# Patient Record
Sex: FEMALE | Race: WHITE | NOT HISPANIC OR LATINO
[De-identification: names, ages, dates, MRNs, and addresses within clinical notes are randomized per-mention and may not be internally consistent; named-entity substitution may affect disease eponyms.]

---

## 2019-05-13 PROBLEM — Z00.00 ENCOUNTER FOR PREVENTIVE HEALTH EXAMINATION: Status: ACTIVE | Noted: 2019-05-13

## 2019-05-18 ENCOUNTER — APPOINTMENT (OUTPATIENT)
Dept: CT IMAGING | Facility: HOSPITAL | Age: 49
End: 2019-05-18
Payer: MEDICAID

## 2019-05-18 ENCOUNTER — OUTPATIENT (OUTPATIENT)
Dept: OUTPATIENT SERVICES | Facility: HOSPITAL | Age: 49
LOS: 1 days | End: 2019-05-18
Payer: MEDICAID

## 2019-05-18 PROCEDURE — 70486 CT MAXILLOFACIAL W/O DYE: CPT

## 2019-05-18 PROCEDURE — 70486 CT MAXILLOFACIAL W/O DYE: CPT | Mod: 26

## 2019-07-09 ENCOUNTER — APPOINTMENT (OUTPATIENT)
Dept: OTOLARYNGOLOGY | Facility: CLINIC | Age: 49
End: 2019-07-09
Payer: MEDICAID

## 2019-07-09 VITALS
BODY MASS INDEX: 21.97 KG/M2 | SYSTOLIC BLOOD PRESSURE: 119 MMHG | DIASTOLIC BLOOD PRESSURE: 72 MMHG | WEIGHT: 140 LBS | HEIGHT: 67 IN | HEART RATE: 73 BPM

## 2019-07-09 DIAGNOSIS — Z80.9 FAMILY HISTORY OF MALIGNANT NEOPLASM, UNSPECIFIED: ICD-10-CM

## 2019-07-09 DIAGNOSIS — Z78.9 OTHER SPECIFIED HEALTH STATUS: ICD-10-CM

## 2019-07-09 DIAGNOSIS — Z83.3 FAMILY HISTORY OF DIABETES MELLITUS: ICD-10-CM

## 2019-07-09 DIAGNOSIS — Z82.49 FAMILY HISTORY OF ISCHEMIC HEART DISEASE AND OTHER DISEASES OF THE CIRCULATORY SYSTEM: ICD-10-CM

## 2019-07-09 PROCEDURE — 31231 NASAL ENDOSCOPY DX: CPT

## 2019-07-09 PROCEDURE — 99204 OFFICE O/P NEW MOD 45 MIN: CPT | Mod: 25

## 2019-07-09 RX ORDER — ACETAMINOPHEN/DIPHENHYDRAMINE 500MG-25MG
1000 TABLET ORAL
Refills: 0 | Status: ACTIVE | COMMUNITY

## 2019-07-09 RX ORDER — ASPIRIN 81 MG
81 TABLET, DELAYED RELEASE (ENTERIC COATED) ORAL
Refills: 0 | Status: ACTIVE | COMMUNITY

## 2019-07-09 RX ORDER — FLUTICASONE PROPIONATE 50 UG/1
50 SPRAY, METERED NASAL
Refills: 0 | Status: ACTIVE | COMMUNITY

## 2019-07-09 RX ORDER — UBIDECARENONE/VIT E ACET 100MG-5
CAPSULE ORAL
Refills: 0 | Status: ACTIVE | COMMUNITY

## 2019-07-09 NOTE — PROCEDURE
[FreeTextEntry3] : Nasal Endoscopy:\par nasal airways patent\par middle meati grossly patent\par no mucopus or polyps

## 2019-07-09 NOTE — PHYSICAL EXAM
[Nasal Endoscopy Performed] : nasal endoscopy was performed, see procedure section for findings [Midline] : trachea located in midline position [Normal] : no rashes [FreeTextEntry2] : face symmetric contour [de-identified] : EOMI [de-identified] : CN 2-12 grossly symmetric and intact [de-identified] : V2 intact

## 2019-07-09 NOTE — DATA REVIEWED
[de-identified] : CT Sinus 5/18/19:\par Marked expansion with a predominantly groundglass pattern of mineralization \par is noted at the central skull base diffusely with anterior extension into \par the posterior sinonasal cavity. 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 There is diffuse bilaterally \par symmetric involvement of the body and lesser wings of the sphenoid bone with \par complete encasement of the optic canals which appear mildly and \par symmetrically narrowed. The appearance of the sphenoid greater wings and \par pterygoid processes is that of minimal, if any, expansion and a more \par heterogeneously sclerotic appearance to the bone. There is involvement of \par the posterior aspect of each orbital roof, without significant encroachment \par on the orbital apex. There is involvement of the perpendicular plate of the \par ethmoid, with thickening of the posterior aspect of the nasal septum. There \par is involvement of bilateral posterior ethmoid complex with anterior \par extension on the right side to abut the posterior margin of the patent \par frontal sinus outflow tract. There is masslike expansion of the planum \par sphenoidale with posterior extension to involve the sella floor. There is \par minimal involvement of the dorsal or inferior clivus. At the central skull \par base the bilateral foramen rotundum and vidian canals appear narrow but \par patent. \par \par There is a large polyp or retention cysts in the right maxillary antrum with \par the remaining paranasal sinuses free of abnormal soft tissue. There has been \par previous left mastoidectomy, with the mastoidectomy bowl and remaining \par bilateral mastoid air cells predominantly ventilated. The orbital contents \par are grossly normal. There is no hydrocephalus or large intracranial mass \par lesion. 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

## 2019-07-09 NOTE — HISTORY OF PRESENT ILLNESS
[de-identified] : 48F here for initial evaluation.\par \par She was referred for evaluation after CT sinus showed incidental fibrous dysplasia.\par She has no acute complaints - there is no facial pressure, no nasal congestion/obstruction, no nasal drainage or postnasal drip. Olfaction is intact. She has no numbness or altered sensation in her face. Vision is intact.\par She does c/o intermittent sharp left facial pain.\par \par CT Sinus 5/18/19 (I reviewed images):\par -Marked expansion with a predominantly groundglass pattern of mineralization at the central skull base diffusely with anterior extension into the posterior sinonasal cavity\par -There is diffuse bilateral symmetric involvement of the body and lesser wings of the sphenoid bone with complete encasement of the optic canals which appear mildly and symmetrically narrowed.\par -There is involvement of the posterior aspect of each orbital roof, without significant encroachment on the orbital apex. -At the central skull  base the bilateral foramen rotundum and vidian canals appear narrow but patent.\par \par ROS otherwise unremarkable.

## 2019-07-09 NOTE — ASSESSMENT
[FreeTextEntry1] : 48F here for initial evaluation, referred for evaluation after CT sinus showed fibrous dysplasia. She has no acute complaints - there is no facial pressure, no nasal congestion/obstruction, no nasal drainage or postnasal drip. Olfaction is intact. She has no numbness or altered sensation in her face. Vision is intact. She does c/o intermittent sharp left facial pain which is occasional and random, without triggers. 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 On exam, extraocular muscles are intact with full movement and facial sensation is intact. Nasal endoscopy shows a patent nasal airway. The rest of the head and neck exam is unremarkable.\par Despite her CT showing marked bony expansion of the central skull base diffusely with anterior extension into \par the posterior sinonasal cavity, she is completely asymptomatic, with seemingly intact optic nerves and trigeminal systems. At this point, I will send for formal neuroophthalmologic evaluation, given the complete encasement of the optic canals by the dysplastic bone, which are symmetrically narrowed. RTO after consultation.

## 2019-07-09 NOTE — CONSULT LETTER
[Dear  ___] : Dear ~SHAHRAM, [Courtesy Letter:] : I had the pleasure of seeing your patient, [unfilled], in my office today. [Consult Closing:] : Thank you very much for allowing me to participate in the care of this patient.  If you have any questions, please do not hesitate to contact me. [Sincerely,] : Sincerely, [FreeTextEntry3] : Srinivas Hairston MD\par Department of Otolaryngology - Head and Neck Surgery\par [facility] [DrBakari  ___] : Dr. LAGUNAS [DrBakari ___] : Dr. LAGUNAS

## 2019-09-04 ENCOUNTER — APPOINTMENT (OUTPATIENT)
Dept: OPHTHALMOLOGY | Facility: CLINIC | Age: 49
End: 2019-09-04

## 2019-09-09 ENCOUNTER — EMERGENCY (EMERGENCY)
Facility: HOSPITAL | Age: 49
LOS: 1 days | Discharge: HOME | End: 2019-09-09
Attending: EMERGENCY MEDICINE
Payer: MEDICAID

## 2019-09-09 VITALS
OXYGEN SATURATION: 99 % | RESPIRATION RATE: 18 BRPM | HEIGHT: 64 IN | WEIGHT: 139.99 LBS | SYSTOLIC BLOOD PRESSURE: 121 MMHG | TEMPERATURE: 99 F | DIASTOLIC BLOOD PRESSURE: 77 MMHG | HEART RATE: 75 BPM

## 2019-09-09 LAB
ALBUMIN SERPL ELPH-MCNC: 3.7 G/DL — SIGNIFICANT CHANGE UP (ref 3.5–5)
ALP SERPL-CCNC: 46 U/L — SIGNIFICANT CHANGE UP (ref 40–120)
ALT FLD-CCNC: 19 U/L DA — SIGNIFICANT CHANGE UP (ref 10–60)
ANION GAP SERPL CALC-SCNC: 5 MMOL/L — SIGNIFICANT CHANGE UP (ref 5–17)
APPEARANCE UR: CLEAR — SIGNIFICANT CHANGE UP
AST SERPL-CCNC: 13 U/L — SIGNIFICANT CHANGE UP (ref 10–40)
BILIRUB SERPL-MCNC: 0.2 MG/DL — SIGNIFICANT CHANGE UP (ref 0.2–1.2)
BILIRUB UR-MCNC: NEGATIVE — SIGNIFICANT CHANGE UP
BUN SERPL-MCNC: 20 MG/DL — HIGH (ref 7–18)
CALCIUM SERPL-MCNC: 8.8 MG/DL — SIGNIFICANT CHANGE UP (ref 8.4–10.5)
CHLORIDE SERPL-SCNC: 103 MMOL/L — SIGNIFICANT CHANGE UP (ref 96–108)
CO2 SERPL-SCNC: 29 MMOL/L — SIGNIFICANT CHANGE UP (ref 22–31)
COLOR SPEC: YELLOW — SIGNIFICANT CHANGE UP
CREAT SERPL-MCNC: 1.15 MG/DL — SIGNIFICANT CHANGE UP (ref 0.5–1.3)
DIFF PNL FLD: NEGATIVE — SIGNIFICANT CHANGE UP
GLUCOSE SERPL-MCNC: 94 MG/DL — SIGNIFICANT CHANGE UP (ref 70–99)
GLUCOSE UR QL: NEGATIVE — SIGNIFICANT CHANGE UP
HCG SERPL-ACNC: <1 MIU/ML — SIGNIFICANT CHANGE UP
HCT VFR BLD CALC: 38.3 % — SIGNIFICANT CHANGE UP (ref 34.5–45)
HGB BLD-MCNC: 12 G/DL — SIGNIFICANT CHANGE UP (ref 11.5–15.5)
KETONES UR-MCNC: NEGATIVE — SIGNIFICANT CHANGE UP
LEUKOCYTE ESTERASE UR-ACNC: NEGATIVE — SIGNIFICANT CHANGE UP
LIDOCAIN IGE QN: 185 U/L — SIGNIFICANT CHANGE UP (ref 73–393)
MCHC RBC-ENTMCNC: 27.6 PG — SIGNIFICANT CHANGE UP (ref 27–34)
MCHC RBC-ENTMCNC: 31.3 GM/DL — LOW (ref 32–36)
MCV RBC AUTO: 88.2 FL — SIGNIFICANT CHANGE UP (ref 80–100)
NITRITE UR-MCNC: NEGATIVE — SIGNIFICANT CHANGE UP
NRBC # BLD: 0 /100 WBCS — SIGNIFICANT CHANGE UP (ref 0–0)
PH UR: 6.5 — SIGNIFICANT CHANGE UP (ref 5–8)
PLATELET # BLD AUTO: 215 K/UL — SIGNIFICANT CHANGE UP (ref 150–400)
POTASSIUM SERPL-MCNC: 3.8 MMOL/L — SIGNIFICANT CHANGE UP (ref 3.5–5.3)
POTASSIUM SERPL-SCNC: 3.8 MMOL/L — SIGNIFICANT CHANGE UP (ref 3.5–5.3)
PROT SERPL-MCNC: 7.2 G/DL — SIGNIFICANT CHANGE UP (ref 6–8.3)
PROT UR-MCNC: NEGATIVE — SIGNIFICANT CHANGE UP
RBC # BLD: 4.34 M/UL — SIGNIFICANT CHANGE UP (ref 3.8–5.2)
RBC # FLD: 14.4 % — SIGNIFICANT CHANGE UP (ref 10.3–14.5)
SODIUM SERPL-SCNC: 137 MMOL/L — SIGNIFICANT CHANGE UP (ref 135–145)
SP GR SPEC: 1.01 — SIGNIFICANT CHANGE UP (ref 1.01–1.02)
UROBILINOGEN FLD QL: 1
WBC # BLD: 4.92 K/UL — SIGNIFICANT CHANGE UP (ref 3.8–10.5)
WBC # FLD AUTO: 4.92 K/UL — SIGNIFICANT CHANGE UP (ref 3.8–10.5)

## 2019-09-09 PROCEDURE — 99285 EMERGENCY DEPT VISIT HI MDM: CPT

## 2019-09-09 PROCEDURE — 74177 CT ABD & PELVIS W/CONTRAST: CPT | Mod: 26

## 2019-09-09 RX ORDER — KETOROLAC TROMETHAMINE 30 MG/ML
15 SYRINGE (ML) INJECTION ONCE
Refills: 0 | Status: DISCONTINUED | OUTPATIENT
Start: 2019-09-09 | End: 2019-09-09

## 2019-09-09 RX ADMIN — Medication 15 MILLIGRAM(S): at 22:21

## 2019-09-09 NOTE — ED PROVIDER NOTE - NSFOLLOWUPINSTRUCTIONS_ED_ALL_ED_FT
You have an abnormal finding on your CT scan which requires prompt follow up within 1-2 wks for breast imaging and colonoscopy.   Take augmentin as prescribed.  Please follow up with your personal medical doctor in 24-48 hours.   Bring results from today and these instructions to your visit.  If your symptoms change, get worse or if you have any new symptoms, come to the ER right away.  If you have any questions, call the ER at the phone number on this page.

## 2019-09-09 NOTE — ED ADULT NURSE NOTE - OBJECTIVE STATEMENT
pt c/o of 5/10 abdominal pain started yesterday morning. c/o of nausea but denied vomiting and diarrhea. denies fever

## 2019-09-09 NOTE — ED PROVIDER NOTE - PATIENT PORTAL LINK FT
You can access the FollowMyHealth Patient Portal offered by [facility] by registering at the following website: [email or web address]. By joining [facility]’s FollowMyHealth portal, you will also be able to view your health information using other applications (apps) compatible with our system.

## 2019-09-09 NOTE — ED ADULT NURSE NOTE - NSIMPLEMENTINTERV_GEN_ALL_ED
Implemented All Universal Safety Interventions:  [place] to call system. Call bell, personal items and telephone within reach. Instruct patient to call for assistance. Room bathroom lighting operational. Non-slip footwear when patient is off stretcher. Physically safe environment: no spills, clutter or unnecessary equipment. Stretcher in lowest position, wheels locked, appropriate side rails in place.

## 2019-09-09 NOTE — ED PROVIDER NOTE - PROGRESS NOTE DETAILS
pt endorsed to me. has breast finding which needs f/u pt had recent mammogram = advised may need additional imaging - bring to pcp, rectal mass needs prompt biopsy, rx augmentin for mild divertic -Baldo Stafford MD- The abnomal lab/radiology findings were expressed to the patient. A copy of all the results from today's visit was provided to the patient to assist in the continued workup by the patient's outpatient provider. The patient is advised to follow up with the outpatient provider with these results in hand at the time advised on the discharge document.

## 2019-09-09 NOTE — ED PROVIDER NOTE - NSFOLLOWUPCLINICS_GEN_ALL_ED_FT
[facility]  Gastroenterology  [address] [place] [id number]  Phone: [phone]  Fax:   Follow Up Time:

## 2019-09-09 NOTE — ED PROVIDER NOTE - CARE PROVIDER_API CALL
Fermin Palomares)  Gastroenterology; Internal Medicine  [address] [place] [id number]  Phone: [phone]  Fax: [phone]  Follow Up Time: 7-10 Days

## 2019-09-09 NOTE — ED PROVIDER NOTE - OBJECTIVE STATEMENT
50 yo female with no pmh presents to the ED for LLQ abd pain since yesterday. Pt states pain started as a generalized abd pain and now is localized to the LLQ. She denies N/V/D, fevers, chest pain, sob, cough, headache, dizziness. Pt states that she has occasional constipation but had a small BM today and is passing gas.

## 2019-09-10 VITALS
TEMPERATURE: 98 F | SYSTOLIC BLOOD PRESSURE: 117 MMHG | OXYGEN SATURATION: 100 % | RESPIRATION RATE: 16 BRPM | HEART RATE: 74 BPM | DIASTOLIC BLOOD PRESSURE: 84 MMHG

## 2019-09-10 PROCEDURE — 74177 CT ABD & PELVIS W/CONTRAST: CPT

## 2019-09-10 PROCEDURE — 83690 ASSAY OF LIPASE: CPT

## 2019-09-10 PROCEDURE — 81003 URINALYSIS AUTO W/O SCOPE: CPT

## 2019-09-10 PROCEDURE — 80053 COMPREHEN METABOLIC PANEL: CPT

## 2019-09-10 PROCEDURE — 84702 CHORIONIC GONADOTROPIN TEST: CPT

## 2019-09-10 PROCEDURE — 96374 THER/PROPH/DIAG INJ IV PUSH: CPT

## 2019-09-10 PROCEDURE — 87086 URINE CULTURE/COLONY COUNT: CPT

## 2019-09-10 PROCEDURE — 99284 EMERGENCY DEPT VISIT MOD MDM: CPT | Mod: 25

## 2019-09-10 PROCEDURE — 85027 COMPLETE CBC AUTOMATED: CPT

## 2019-09-10 PROCEDURE — 36415 COLL VENOUS BLD VENIPUNCTURE: CPT

## 2019-09-10 RX ADMIN — Medication 1 TABLET(S): at 00:59

## 2019-09-11 LAB
CULTURE RESULTS: SIGNIFICANT CHANGE UP
SPECIMEN SOURCE: SIGNIFICANT CHANGE UP

## 2020-07-07 ENCOUNTER — APPOINTMENT (OUTPATIENT)
Dept: OTOLARYNGOLOGY | Facility: CLINIC | Age: 50
End: 2020-07-07

## 2021-05-11 ENCOUNTER — APPOINTMENT (OUTPATIENT)
Dept: OTOLARYNGOLOGY | Facility: CLINIC | Age: 51
End: 2021-05-11
Payer: MEDICAID

## 2021-05-11 VITALS
SYSTOLIC BLOOD PRESSURE: 130 MMHG | BODY MASS INDEX: 22.76 KG/M2 | WEIGHT: 145 LBS | HEART RATE: 81 BPM | HEIGHT: 67 IN | DIASTOLIC BLOOD PRESSURE: 82 MMHG | TEMPERATURE: 97.1 F

## 2021-05-11 PROCEDURE — 99214 OFFICE O/P EST MOD 30 MIN: CPT | Mod: 25

## 2021-05-11 PROCEDURE — 31231 NASAL ENDOSCOPY DX: CPT

## 2021-05-11 PROCEDURE — 99072 ADDL SUPL MATRL&STAF TM PHE: CPT

## 2021-05-11 NOTE — DATA REVIEWED
[de-identified] : CT Sinus 5/18/19:\par Marked expansion with a predominantly groundglass pattern of mineralization \par is noted at the central skull base diffusely with anterior extension into \par the posterior sinonasal cavity. 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 There is diffuse bilaterally \par symmetric involvement of the body and lesser wings of the sphenoid bone with \par complete encasement of the optic canals which appear mildly and \par symmetrically narrowed. The appearance of the sphenoid greater wings and \par pterygoid processes is that of minimal, if any, expansion and a more \par heterogeneously sclerotic appearance to the bone. There is involvement of \par the posterior aspect of each orbital roof, without significant encroachment \par on the orbital apex. There is involvement of the perpendicular plate of the \par ethmoid, with thickening of the posterior aspect of the nasal septum. There \par is involvement of bilateral posterior ethmoid complex with anterior \par extension on the right side to abut the posterior margin of the patent \par frontal sinus outflow tract. There is masslike expansion of the planum \par sphenoidale with posterior extension to involve the sella floor. There is \par minimal involvement of the dorsal or inferior clivus. At the central skull \par base the bilateral foramen rotundum and vidian canals appear narrow but \par patent. \par \par There is a large polyp or retention cysts in the right maxillary antrum with \par the remaining paranasal sinuses free of abnormal soft tissue. There has been \par previous left mastoidectomy, with the mastoidectomy bowl and remaining \par bilateral mastoid air cells predominantly ventilated. The orbital contents \par are grossly normal. There is no hydrocephalus or large intracranial mass \par lesion. 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

## 2021-05-11 NOTE — ASSESSMENT
[FreeTextEntry1] : 50F here in followup. Since last seen 2 years ago she remains mostly status quo. There are headaches, but no worse than usual. 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 Olfaction is intact. She has no numbness or altered sensation in her face. Vision is intact.\par She does c/o intermittent sharp left facial pain.\par Recent MRI shows a 5.7x4.8x4.4cm mass infiltrating the posterior nasal cavity, sphenoid and posterior ethmoid sinuses, sphenoid wings, anterior clinoid processes w expansion, right orbital expansion w abutment of the optic nerves and cavernous ICAs w minimal progression based on prior study.\par On exam, extraocular muscles are intact with full movement and facial sensation is intact. Nasal endoscopy shows a patent nasal airway. The rest of the head and neck exam is unremarkable.\par She has fairly large burden of fibrous dysplasia on recent MRI, but not with much progression based on the prior CT from 2019. However, she is completely asymptomatic, with seemingly intact optic nerves and trigeminal systems. At this point, I will send for new CT sinus and also for formal neuroophthalmologic evaluation, given the complete encasement of the optic canals by the dysplastic bone, which are symmetrically narrowed. RTO after consultation and imaging.

## 2021-05-11 NOTE — PHYSICAL EXAM
[Nasal Endoscopy Performed] : nasal endoscopy was performed, see procedure section for findings [Midline] : trachea located in midline position [Normal] : no rashes [FreeTextEntry2] : face symmetric contour [de-identified] : EOMI [de-identified] : CN 2-12 grossly symmetric and intact [de-identified] : V2 intact

## 2021-05-11 NOTE — CONSULT LETTER
[Dear  ___] : Dear ~SHAHRAM, [Courtesy Letter:] : I had the pleasure of seeing your patient, [unfilled], in my office today. [Consult Closing:] : Thank you very much for allowing me to participate in the care of this patient.  If you have any questions, please do not hesitate to contact me. [Sincerely,] : Sincerely, [DrBakari  ___] : Dr. LAGUNAS [DrBakari ___] : Dr. LAGUNAS [FreeTextEntry3] : Srinivas Hairston MD\par Department of Otolaryngology - Head and Neck Surgery\par [facility]

## 2021-05-11 NOTE — HISTORY OF PRESENT ILLNESS
[de-identified] : 50F here in followup.\par \par Since last seen 2 years ago she remains mostly status quo. 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 Olfaction is intact. She has no numbness or altered sensation in her face. Vision is intact.\par She does c/o intermittent sharp left facial pain.\par \par Had Recent MRI 5/2021:\par -5.7x4.8x4.4cm mass infiltrating the posterior nasal cavity, sphenoid and posterior ethmoid sinuses, sphenoid wings, anterior clinoid processes w expansion, right orbital expansion w abutment of the optic nerves and cavernous ICAs w minimal progression based on prior study \par \par CT Sinus 5/18/19 (I reviewed images):\par -Marked expansion with a predominantly groundglass pattern of mineralization at the central skull base diffusely with anterior extension into the posterior sinonasal cavity\par -There is diffuse bilateral symmetric involvement of the body and lesser wings of the sphenoid bone with complete encasement of the optic canals which appear mildly and symmetrically narrowed.\par -There is involvement of the posterior aspect of each orbital roof, without significant encroachment on the orbital apex. -At the central skull  base the bilateral foramen rotundum and vidian canals appear narrow but patent.\par \par ROS otherwise unremarkable.

## 2021-05-26 ENCOUNTER — NON-APPOINTMENT (OUTPATIENT)
Age: 51
End: 2021-05-26

## 2021-05-26 ENCOUNTER — APPOINTMENT (OUTPATIENT)
Dept: OPHTHALMOLOGY | Facility: CLINIC | Age: 51
End: 2021-05-26
Payer: MEDICAID

## 2021-05-26 PROCEDURE — 99204 OFFICE O/P NEW MOD 45 MIN: CPT

## 2021-05-26 PROCEDURE — 92083 EXTENDED VISUAL FIELD XM: CPT

## 2021-05-28 ENCOUNTER — OUTPATIENT (OUTPATIENT)
Dept: OUTPATIENT SERVICES | Facility: HOSPITAL | Age: 51
LOS: 1 days | End: 2021-05-28
Payer: MEDICAID

## 2021-05-28 ENCOUNTER — APPOINTMENT (OUTPATIENT)
Dept: CT IMAGING | Facility: HOSPITAL | Age: 51
End: 2021-05-28

## 2021-05-28 ENCOUNTER — RESULT REVIEW (OUTPATIENT)
Age: 51
End: 2021-05-28

## 2021-05-28 PROCEDURE — 70486 CT MAXILLOFACIAL W/O DYE: CPT | Mod: 26

## 2021-05-28 PROCEDURE — 70486 CT MAXILLOFACIAL W/O DYE: CPT

## 2021-06-15 ENCOUNTER — APPOINTMENT (OUTPATIENT)
Dept: OTOLARYNGOLOGY | Facility: CLINIC | Age: 51
End: 2021-06-15
Payer: MEDICAID

## 2021-06-15 VITALS
WEIGHT: 163 LBS | SYSTOLIC BLOOD PRESSURE: 125 MMHG | BODY MASS INDEX: 27.83 KG/M2 | HEIGHT: 64 IN | DIASTOLIC BLOOD PRESSURE: 83 MMHG | TEMPERATURE: 97.1 F | HEART RATE: 90 BPM

## 2021-06-15 PROCEDURE — 92550 TYMPANOMETRY & REFLEX THRESH: CPT

## 2021-06-15 PROCEDURE — 92557 COMPREHENSIVE HEARING TEST: CPT

## 2021-06-15 PROCEDURE — 31231 NASAL ENDOSCOPY DX: CPT

## 2021-06-15 PROCEDURE — 99213 OFFICE O/P EST LOW 20 MIN: CPT | Mod: 25

## 2021-06-15 RX ORDER — FLUTICASONE PROPIONATE 50 UG/1
50 SPRAY, METERED NASAL DAILY
Qty: 1 | Refills: 5 | Status: ACTIVE | COMMUNITY
Start: 2021-06-15 | End: 1900-01-01

## 2021-06-15 NOTE — ASSESSMENT
[FreeTextEntry1] : 50F here in followup. Since last seen, she is status quo. She c/o occasional right ear fullness/pressure and ringing. There is also mild nasal congestion. There are headaches, but no worse than usual. She also has some lightheadedness which is occasional. 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 Olfaction is intact. She has no numbness or altered sensation in her face. Vision is intact. Recent neuroophthalmology evaluation is normal.\par Recent MRI shows a 5.7x4.8x4.4cm mass infiltrating the posterior nasal cavity, sphenoid and posterior ethmoid sinuses, sphenoid wings, anterior clinoid processes w expansion, right orbital expansion w abutment of the optic nerves and cavernous ICAs w minimal progression based on prior study.\par CT sinus shows no significant change in fibrous dysplastic changes of the anterior skull base when compared to the May 2019 exam. Audiogram from today is normal. On exam, extraocular muscles are intact with full movement and facial sensation is intact. Nasal endoscopy shows a patent nasal airway with turbinate hypertrophy. The rest of the head and neck exam is unremarkable.\par She has fairly large burden of fibrous dysplasia on recent MRI, but not with much progression based on recent imaging relative to imaging in 2019. Additionally, neuroophthalmologic evaluation is unremarkable. Pt reassured for now. Will give flonase for her rhinitis. RTO 1 year with new imaging, or sooner should anything occur in the interim.

## 2021-06-15 NOTE — PHYSICAL EXAM
[Nasal Endoscopy Performed] : nasal endoscopy was performed, see procedure section for findings [Midline] : trachea located in midline position [Normal] : no rashes [FreeTextEntry1] : Ad: EAC clear, TM intact and hypermobile, ME clear\par As: EAC clear, TM intact w sclerosis, mobile, ME clear [FreeTextEntry2] : face symmetric contour [de-identified] : EOMI [de-identified] : CN 2-12 grossly symmetric and intact [de-identified] : V2 intact

## 2021-06-15 NOTE — HISTORY OF PRESENT ILLNESS
[de-identified] : 50F here in followup.\par \par Since last seen, she is status quo. She c/o occasional right ear fullness/pressure and ringing. There is also mild nasal congestion. There are headaches, but no worse than usual. 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 Olfaction is intact. She has no numbness or altered sensation in her face. Vision is intact. Recent neuroophthalmology evaluation is normal.\par \par Had Recent MRI 5/2021:\par -5.7x4.8x4.4cm mass infiltrating the posterior nasal cavity, sphenoid and posterior ethmoid sinuses, sphenoid wings, anterior clinoid processes w expansion, right orbital expansion w abutment of the optic nerves and cavernous ICAs w minimal progression based on prior study \par \par CT Sinus 5/28/21 (I reviewed images):\par -There has been no significant change in fibrous dysplastic changes of the anterior skull base when compared to the May 2019 exam.\par \par Audiogram from today:\par hearing symmetric and wnl, SRT 20, 100% discrim, right Ad, left A\par \par ROS otherwise unremarkable.

## 2021-06-15 NOTE — CONSULT LETTER
[Dear  ___] : Dear ~SHAHRAM, [Courtesy Letter:] : I had the pleasure of seeing your patient, [unfilled], in my office today. [Consult Closing:] : Thank you very much for allowing me to participate in the care of this patient.  If you have any questions, please do not hesitate to contact me. [Sincerely,] : Sincerely, [DrBakari  ___] : Dr. LAGUNAS [DrBakari ___] : Dr. LAGUNAS [FreeTextEntry3] : Srinivas Hairston MD\par Department of Otolaryngology - Head and Neck Surgery\par [facility]

## 2021-06-15 NOTE — DATA REVIEWED
[de-identified] : CT Sinus 5/28/21:\par COMPARISON: Maxillofacial CT from 5/18/2019\par \par ZYGOMATIC ARCHES: Intact without fracture.\par \par SKULL BASE: There is again expansile and groundglass appearance of the central skull base involving the greater and lesser wings of the sphenoid bone, extending anteriorly to involve the ethmoid bone and posteriorly to the anterior clinoid processes. The carotid canals are within normal limits. There is additional involvement of the posterior aspect of the orbital roof without significant encroachment of the orbital apex as well as involving the perpendicular plate of the ethmoid, posterior aspect of the nasal septum, bilateral posterior ethmoid complex which abuts the right frontal sinus outflow tract. There is additional masslike bony expansion of the planum sphenoidale extending posteriorly to the floor of the sella. There is relative sparing of the dorsal inferior clivus. There is additional narrowing of the bilateral foramen rotundum and vidian canals. Overall appearance is not significantly changed from the May 2019 exam.\par \par \par There there is polypoid thickening of the inferior left maxillary sinus. The remaining paranasal sinuses and mastoid air cells are clear.There is leftward nasal deviation. There is no large central embolus or large intracranial mass lesion.\par \par \par \par \par IMPRESSION:\par There has been no significant change in fibrous dysplastic changes of the anterior skull base when compared to the May 2019 exam.\par CT Sinus 5/18/19:\par Marked expansion with a predominantly groundglass pattern of mineralization \par is noted at the central skull base diffusely with anterior extension into \par the posterior sinonasal cavity. 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 There is diffuse bilaterally \par symmetric involvement of the body and lesser wings of the sphenoid bone with \par complete encasement of the optic canals which appear mildly and \par symmetrically narrowed. The appearance of the sphenoid greater wings and \par pterygoid processes is that of minimal, if any, expansion and a more \par heterogeneously sclerotic appearance to the bone. There is involvement of \par the posterior aspect of each orbital roof, without significant encroachment \par on the orbital apex. There is involvement of the perpendicular plate of the \par ethmoid, with thickening of the posterior aspect of the nasal septum. There \par is involvement of bilateral posterior ethmoid complex with anterior \par extension on the right side to abut the posterior margin of the patent \par frontal sinus outflow tract. There is masslike expansion of the planum \par sphenoidale with posterior extension to involve the sella floor. There is \par minimal involvement of the dorsal or inferior clivus. At the central skull \par base the bilateral foramen rotundum and vidian canals appear narrow but \par patent. \par \par There is a large polyp or retention cysts in the right maxillary antrum with \par the remaining paranasal sinuses free of abnormal soft tissue. There has been \par previous left mastoidectomy, with the mastoidectomy bowl and remaining \par bilateral mastoid air cells predominantly ventilated. The orbital contents \par are grossly normal. There is no hydrocephalus or large intracranial mass \par lesion. 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

## 2021-06-15 NOTE — PROCEDURE
[FreeTextEntry3] : Nasal Endoscopy:\par inferior turbinate hypertrophy\par nasal airways patent\par middle meati grossly patent\par no mucopus or polyps

## 2021-06-25 NOTE — ED PROVIDER NOTE - HEME LYMPH, MLM
pt complaining of left eye pain and swelling for the past 2 days. denies any injury to the eye No adenopathy or splenomegaly. No cervical or inguinal lymphadenopathy.

## 2021-09-14 ENCOUNTER — APPOINTMENT (OUTPATIENT)
Dept: OTOLARYNGOLOGY | Facility: CLINIC | Age: 51
End: 2021-09-14

## 2021-11-18 ENCOUNTER — APPOINTMENT (OUTPATIENT)
Dept: OTOLARYNGOLOGY | Facility: CLINIC | Age: 51
End: 2021-11-18
Payer: MEDICAID

## 2021-11-18 PROCEDURE — 99213 OFFICE O/P EST LOW 20 MIN: CPT | Mod: 25

## 2021-11-18 PROCEDURE — 31231 NASAL ENDOSCOPY DX: CPT

## 2021-11-18 NOTE — CONSULT LETTER
[Dear  ___] : Dear ~SHAHRAM, [Courtesy Letter:] : I had the pleasure of seeing your patient, [unfilled], in my office today. [Consult Closing:] : Thank you very much for allowing me to participate in the care of this patient.  If you have any questions, please do not hesitate to contact me. [Sincerely,] : Sincerely, [FreeTextEntry3] : Srinivas Hairston MD\par Department of Otolaryngology - Head and Neck Surgery\par [facility] [DrBakari  ___] : Dr. LAGUNAS [DrBakari ___] : Dr. LAGUNAS

## 2021-11-18 NOTE — HISTORY OF PRESENT ILLNESS
[de-identified] : 51F here in followup.\par \par Since last seen, she feels she is not doing great. She has panic attacks and severe anxiety. There is also occasional right ear fullness/pressure and ringing. There is also pressure over her nasal bridge and between her eyes. There are headaches, but no worse than usual. 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 Olfaction is intact. She has no numbness or altered sensation in her face. Vision is intact. Recent neuroophthalmology evaluation is normal.\par \par MRI 5/2021:\par -5.7x4.8x4.4cm mass infiltrating the posterior nasal cavity, sphenoid and posterior ethmoid sinuses, sphenoid wings, anterior clinoid processes w expansion, right orbital expansion w abutment of the optic nerves and cavernous ICAs w minimal progression based on prior study \par \par CT Sinus 5/28/21 (I reviewed images):\par -There has been no significant change in fibrous dysplastic changes of the anterior skull base when compared to the May 2019 exam.\par \par Audiogram from 6/2021:\par hearing symmetric and wnl, SRT 20, 100% discrim, right Ad, left A\par \par ROS otherwise unremarkable.

## 2021-11-18 NOTE — PROCEDURE
[FreeTextEntry3] : Nasal Endoscopy:\par inferior turbinate hypertrophy\par mild sinonasal mucosal erythema\par nasal airways patent\par middle meati grossly patent\par no mucopus or polyps

## 2021-11-18 NOTE — ASSESSMENT
[FreeTextEntry1] : 51F here in followup. Since last seen 6 months ago, she feels she is not doing great. She has panic attacks and severe anxiety. There is also occasional right ear fullness/pressure and ringing. There is also pressure over her nasal bridge and between her eyes w lightheadedness and headaches. 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 There is no facial pressure, no nasal drainage or postnasal drip. Olfaction is intact. She has no numbness or altered sensation in her face. Vision is intact. Recent neuroophthalmology evaluation is normal. MRI 5/2021 shows a 5.7x4.8x4.4cm mass infiltrating the posterior nasal cavity, sphenoid and posterior ethmoid sinuses, sphenoid wings, anterior clinoid processes w expansion, right orbital expansion w abutment of the optic nerves and cavernous ICAs w minimal progression based on prior study. CT 5/2021 shows no significant change in fibrous dysplastic changes of the anterior skull base when compared to the May 2019 exam. Audiogram from 6/2021 is normal. On exam, extraocular muscles are intact with full movement and facial sensation is intact. Nasal endoscopy shows a patent nasal airway with mild turbinate hypertrophy. The rest of the head and neck exam is unremarkable.\par She has fairly large burden of fibrous dysplasia on MRI/CT 5/2021, but not with much progression relative to imaging in 2019. She however is c/o worsening headaches, lightheadedness, anxiety and panic attacks. I do not think her fibrous dysplasia is causative to these sx. She has no compressive cranial neuropathies. To be complete, will repeat CT sinus to ensure no disease progression. Will also send to my neurosurgery colleague for evaluation as well given extensive skull base management. RTO after imaging.

## 2021-11-18 NOTE — DATA REVIEWED
[de-identified] : CT Sinus 5/28/21:\par COMPARISON: Maxillofacial CT from 5/18/2019\par \par ZYGOMATIC ARCHES: Intact without fracture.\par \par SKULL BASE: There is again expansile and groundglass appearance of the central skull base involving the greater and lesser wings of the sphenoid bone, extending anteriorly to involve the ethmoid bone and posteriorly to the anterior clinoid processes. The carotid canals are within normal limits. There is additional involvement of the posterior aspect of the orbital roof without significant encroachment of the orbital apex as well as involving the perpendicular plate of the ethmoid, posterior aspect of the nasal septum, bilateral posterior ethmoid complex which abuts the right frontal sinus outflow tract. There is additional masslike bony expansion of the planum sphenoidale extending posteriorly to the floor of the sella. There is relative sparing of the dorsal inferior clivus. There is additional narrowing of the bilateral foramen rotundum and vidian canals. Overall appearance is not significantly changed from the May 2019 exam.\par \par \par There there is polypoid thickening of the inferior left maxillary sinus. The remaining paranasal sinuses and mastoid air cells are clear.There is leftward nasal deviation. There is no large central embolus or large intracranial mass lesion.\par \par \par \par \par IMPRESSION:\par There has been no significant change in fibrous dysplastic changes of the anterior skull base when compared to the May 2019 exam.\par CT Sinus 5/18/19:\par Marked expansion with a predominantly groundglass pattern of mineralization \par is noted at the central skull base diffusely with anterior extension into \par the posterior sinonasal cavity. 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 There is diffuse bilaterally \par symmetric involvement of the body and lesser wings of the sphenoid bone with \par complete encasement of the optic canals which appear mildly and \par symmetrically narrowed. The appearance of the sphenoid greater wings and \par pterygoid processes is that of minimal, if any, expansion and a more \par heterogeneously sclerotic appearance to the bone. There is involvement of \par the posterior aspect of each orbital roof, without significant encroachment \par on the orbital apex. There is involvement of the perpendicular plate of the \par ethmoid, with thickening of the posterior aspect of the nasal septum. There \par is involvement of bilateral posterior ethmoid complex with anterior \par extension on the right side to abut the posterior margin of the patent \par frontal sinus outflow tract. There is masslike expansion of the planum \par sphenoidale with posterior extension to involve the sella floor. There is \par minimal involvement of the dorsal or inferior clivus. At the central skull \par base the bilateral foramen rotundum and vidian canals appear narrow but \par patent. \par \par There is a large polyp or retention cysts in the right maxillary antrum with \par the remaining paranasal sinuses free of abnormal soft tissue. There has been \par previous left mastoidectomy, with the mastoidectomy bowl and remaining \par bilateral mastoid air cells predominantly ventilated. The orbital contents \par are grossly normal. There is no hydrocephalus or large intracranial mass \par lesion. 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

## 2021-11-18 NOTE — PHYSICAL EXAM
[Nasal Endoscopy Performed] : nasal endoscopy was performed, see procedure section for findings [Midline] : trachea located in midline position [Normal] : no rashes [FreeTextEntry2] : face symmetric contour [de-identified] : EOMI [de-identified] : CN 2-12 grossly symmetric and intact [de-identified] : V2 intact [FreeTextEntry1] : Ad: EAC clear, TM intact and hypermobile, ME clear\par As: EAC clear, TM intact w sclerosis, mobile, ME clear

## 2021-12-08 ENCOUNTER — APPOINTMENT (OUTPATIENT)
Dept: CT IMAGING | Facility: HOSPITAL | Age: 51
End: 2021-12-08
Payer: MEDICAID

## 2021-12-08 ENCOUNTER — OUTPATIENT (OUTPATIENT)
Dept: OUTPATIENT SERVICES | Facility: HOSPITAL | Age: 51
LOS: 1 days | End: 2021-12-08
Payer: MEDICAID

## 2021-12-08 PROCEDURE — 70486 CT MAXILLOFACIAL W/O DYE: CPT

## 2021-12-08 PROCEDURE — 70486 CT MAXILLOFACIAL W/O DYE: CPT | Mod: 26

## 2022-06-10 ENCOUNTER — APPOINTMENT (OUTPATIENT)
Dept: OTOLARYNGOLOGY | Facility: CLINIC | Age: 52
End: 2022-06-10
Payer: MEDICAID

## 2022-06-10 VITALS
TEMPERATURE: 97 F | HEART RATE: 83 BPM | BODY MASS INDEX: 27.83 KG/M2 | SYSTOLIC BLOOD PRESSURE: 120 MMHG | DIASTOLIC BLOOD PRESSURE: 81 MMHG | HEIGHT: 64 IN | WEIGHT: 163 LBS

## 2022-06-10 DIAGNOSIS — R05.3 CHRONIC COUGH: ICD-10-CM

## 2022-06-10 DIAGNOSIS — J31.0 CHRONIC RHINITIS: ICD-10-CM

## 2022-06-10 DIAGNOSIS — M85.00 FIBROUS DYSPLASIA (MONOSTOTIC), UNSPECIFIED SITE: ICD-10-CM

## 2022-06-10 PROCEDURE — 31575 DIAGNOSTIC LARYNGOSCOPY: CPT

## 2022-06-10 PROCEDURE — 99214 OFFICE O/P EST MOD 30 MIN: CPT | Mod: 25

## 2022-06-10 RX ORDER — FAMOTIDINE 40 MG/1
40 TABLET, FILM COATED ORAL
Qty: 30 | Refills: 2 | Status: ACTIVE | COMMUNITY
Start: 2022-06-10 | End: 1900-01-01

## 2022-06-10 RX ORDER — PANTOPRAZOLE 40 MG/1
40 TABLET, DELAYED RELEASE ORAL
Qty: 30 | Refills: 5 | Status: ACTIVE | COMMUNITY
Start: 2022-06-10 | End: 1900-01-01

## 2022-06-10 NOTE — CONSULT LETTER
[Dear  ___] : Dear ~SHAHRAM, [Courtesy Letter:] : I had the pleasure of seeing your patient, [unfilled], in my office today. [Consult Closing:] : Thank you very much for allowing me to participate in the care of this patient.  If you have any questions, please do not hesitate to contact me. [Sincerely,] : Sincerely, [FreeTextEntry3] : Srinivas Hairston MD\par Department of Otolaryngology - Head and Neck Surgery\par [facility] [DrBakari  ___] : Dr. LAGUNAS [DrBakari ___] : Dr. LAGUNAS

## 2022-06-10 NOTE — ASSESSMENT
[FreeTextEntry1] : 51F here in followup. Since last seen 7 months ago, she c/o chronic cough. The coughing is dry and worse at night and can sometimes wake her up. She thinks it is coming from something dripping from her nose - like a postnasal drip. She has remained on nasal steroid sprays and allergy meds without improvement. She has also tried low dose PPI (20mg omeprazole) without improvement. She has been on sertraline too for panic attacks and severe anxiety and does not think it has helped much either. There is also pressure over her nasal bridge and between her eyes. There are headaches, but no worse than usual. 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 There is no facial pressure, no nasal drainage. Olfaction is intact. She has no numbness or altered sensation in her face. Vision is intact. Recent neuroophthalmology evaluation is normal. CT 12/2021 shows no significant change in fibrous dysplastic changes of the anterior skull base when compared to the May 2019 exam. MRI 5/2021 shows a 5.7x4.8x4.4cm mass infiltrating the posterior nasal cavity, sphenoid and posterior ethmoid sinuses, sphenoid wings, anterior clinoid processes w expansion, right orbital expansion w abutment of the optic nerves and cavernous ICAs w minimal progression based on prior study. Audiogram from 6/2021 is normal. On exam, extraocular muscles are intact with full movement and facial sensation is intact. Nasal endoscopy shows a patent nasal airway with mild turbinate hypertrophy and scant clear postnasal mucus. The rest of the head and neck exam, including fiberoptic laryngoscopy, is unremarkable.\par She has fairly large burden of fibrous dysplasia on MRI/CT, but not with much progression relative to imaging in 2019. She is c/o coughing she feels is from a postnasal drip, though endoscopy is fairly unremarkable. I do not think her fibrous dysplasia is causative to these sx. She has no compressive cranial neuropathies. For now, will try PPI/H2B to treat cough as perhaps secondary to reflux, along with diet/lifestyle changes. Continue flonase. RTO 6 weeks. Should coughing remain, will try amitriptyline. Will also send to my neurosurgery colleague for evaluation as well given extensive skull base management.

## 2022-06-10 NOTE — DATA REVIEWED
[de-identified] : CT Sinus 12/8/21:\par FINDINGS:\par \par Since 5/18/2019, no significant change in expansile bone lesion involving the body, greater wings, and lesser wings of the sphenoid bone, posterior orbital roofs, posterior sinonasal cavity, perpendicular plate of the ethmoid, posterior bony nasal septum, clivus, and posterior ethmoid bone. This bone lesion contains a groundglass pattern mineralization, diagnostic of fibrous dysplasia. There are stable encasement of the optic canals bilaterally, but without high-grade narrowing thereof, and the anterior clinoid processes are both expanded and symmetric. Slight right proptosis suggested as there is more bulky fibrous dysplasia extending into the right posterior extraconal orbit. Carotid canals essentially preserved, minimally contoured on the left. Status post left CWU mastoidectomy. Images of the intracranial compartment show no hydrocephalus or space-occupying lesion.\par \par IMPRESSION:\par \par Stable central skull base fibrous dysplasia comparing to May 2019.\par \par \par CT Sinus 5/28/21:\par COMPARISON: Maxillofacial CT from 5/18/2019\par \par ZYGOMATIC ARCHES: Intact without fracture.\par \par SKULL BASE: There is again expansile and groundglass appearance of the central skull base involving the greater and lesser wings of the sphenoid bone, extending anteriorly to involve the ethmoid bone and posteriorly to the anterior clinoid processes. The carotid canals are within normal limits. There is additional involvement of the posterior aspect of the orbital roof without significant encroachment of the orbital apex as well as involving the perpendicular plate of the ethmoid, posterior aspect of the nasal septum, bilateral posterior ethmoid complex which abuts the right frontal sinus outflow tract. There is additional masslike bony expansion of the planum sphenoidale extending posteriorly to the floor of the sella. There is relative sparing of the dorsal inferior clivus. There is additional narrowing of the bilateral foramen rotundum and vidian canals. Overall appearance is not significantly changed from the May 2019 exam.\par \par \par There there is polypoid thickening of the inferior left maxillary sinus. The remaining paranasal sinuses and mastoid air cells are clear.There is leftward nasal deviation. There is no large central embolus or large intracranial mass lesion.\par \par \par \par \par IMPRESSION:\par There has been no significant change in fibrous dysplastic changes of the anterior skull base when compared to the May 2019 exam.\par CT Sinus 5/18/19:\par Marked expansion with a predominantly groundglass pattern of mineralization \par is noted at the central skull base diffusely with anterior extension into \par the posterior sinonasal cavity. 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 There is diffuse bilaterally \par symmetric involvement of the body and lesser wings of the sphenoid bone with \par complete encasement of the optic canals which appear mildly and \par symmetrically narrowed. The appearance of the sphenoid greater wings and \par pterygoid processes is that of minimal, if any, expansion and a more \par heterogeneously sclerotic appearance to the bone. There is involvement of \par the posterior aspect of each orbital roof, without significant encroachment \par on the orbital apex. There is involvement of the perpendicular plate of the \par ethmoid, with thickening of the posterior aspect of the nasal septum. There \par is involvement of bilateral posterior ethmoid complex with anterior \par extension on the right side to abut the posterior margin of the patent \par frontal sinus outflow tract. There is masslike expansion of the planum \par sphenoidale with posterior extension to involve the sella floor. There is \par minimal involvement of the dorsal or inferior clivus. At the central skull \par base the bilateral foramen rotundum and vidian canals appear narrow but \par patent. \par \par There is a large polyp or retention cysts in the right maxillary antrum with \par the remaining paranasal sinuses free of abnormal soft tissue. There has been \par previous left mastoidectomy, with the mastoidectomy bowl and remaining \par bilateral mastoid air cells predominantly ventilated. The orbital contents \par are grossly normal. There is no hydrocephalus or large intracranial mass \par lesion. 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

## 2022-06-10 NOTE — PROCEDURE
[FreeTextEntry3] : Nasal Endoscopy:\par mild inferior turbinate hypertrophy\par mild sinonasal mucosal erythema\par nasal airways patent\par middle meati grossly patent\par no mucopus or polyps\par scant clear postnasal mucus\par \par Fiberoptic Laryngoscopy:\par upper airway widely patent\par no masses/lesions\par mild postcricoid erythema\par TVF symmetric and mobile

## 2022-06-10 NOTE — HISTORY OF PRESENT ILLNESS
[de-identified] : 51F here in followup.\par \par Since last seen 7 months ago, she c/o chronic cough. The coughing is dry and worse at night and can sometimes wake her up. She thinks it is coming from something dripping from her nose - like a postnasal drip. She has remained on nasal steroid sprays and allergy meds without improvement. She has also tried low dose PPI (20mg omeprazole) without improvement.  \par She has been on sertraline too for panic attacks and severe anxiety and does not think it has helped much either.\par There is also pressure over her nasal bridge and between her eyes. There are headaches, but no worse than usual. 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 Olfaction is intact. She has no numbness or altered sensation in her face. Vision is intact. Recent neuroophthalmology evaluation is normal.\par \par CT Sinus 12/8/21 (I reviewed images):\par -Stable central skull base fibrous dysplasia compared to May 2019.\par \par MRI 5/2021:\par -5.7x4.8x4.4cm mass infiltrating the posterior nasal cavity, sphenoid and posterior ethmoid sinuses, sphenoid wings, anterior clinoid processes w expansion, right orbital expansion w abutment of the optic nerves and cavernous ICAs w minimal progression based on prior study \par \par Audiogram from 6/2021:\par hearing symmetric and wnl, SRT 20, 100% discrim, right Ad, left A\par \par ROS otherwise unremarkable.

## 2022-07-22 ENCOUNTER — APPOINTMENT (OUTPATIENT)
Dept: OTOLARYNGOLOGY | Facility: CLINIC | Age: 52
End: 2022-07-22